# Patient Record
Sex: FEMALE | Race: WHITE | URBAN - METROPOLITAN AREA
[De-identification: names, ages, dates, MRNs, and addresses within clinical notes are randomized per-mention and may not be internally consistent; named-entity substitution may affect disease eponyms.]

---
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
## 2025-03-01 ASSESSMENT — PATIENT HEALTH QUESTIONNAIRE - PHQ9
1. LITTLE INTEREST OR PLEASURE IN DOING THINGS: NOT AT ALL
SUM OF ALL RESPONSES TO PHQ QUESTIONS 1-9: 0
SUM OF ALL RESPONSES TO PHQ9 QUESTIONS 1 & 2: 0
SUM OF ALL RESPONSES TO PHQ QUESTIONS 1-9: 0
SUM OF ALL RESPONSES TO PHQ QUESTIONS 1-9: 0
1. LITTLE INTEREST OR PLEASURE IN DOING THINGS: NOT AT ALL
2. FEELING DOWN, DEPRESSED OR HOPELESS: NOT AT ALL
2. FEELING DOWN, DEPRESSED OR HOPELESS: NOT AT ALL
SUM OF ALL RESPONSES TO PHQ QUESTIONS 1-9: 0

## 2025-03-04 ENCOUNTER — OFFICE VISIT (OUTPATIENT)
Dept: PRIMARY CARE CLINIC | Age: 20
End: 2025-03-04
Payer: COMMERCIAL

## 2025-03-04 VITALS
RESPIRATION RATE: 17 BRPM | WEIGHT: 122 LBS | OXYGEN SATURATION: 98 % | DIASTOLIC BLOOD PRESSURE: 66 MMHG | BODY MASS INDEX: 20.83 KG/M2 | SYSTOLIC BLOOD PRESSURE: 110 MMHG | TEMPERATURE: 97.9 F | HEART RATE: 77 BPM | HEIGHT: 64 IN

## 2025-03-04 DIAGNOSIS — M22.2X1 PATELLOFEMORAL ARTHRALGIA OF BOTH KNEES: ICD-10-CM

## 2025-03-04 DIAGNOSIS — Z00.00 HEALTHCARE MAINTENANCE: Primary | ICD-10-CM

## 2025-03-04 DIAGNOSIS — M22.2X2 PATELLOFEMORAL ARTHRALGIA OF BOTH KNEES: ICD-10-CM

## 2025-03-04 PROCEDURE — 99395 PREV VISIT EST AGE 18-39: CPT | Performed by: FAMILY MEDICINE

## 2025-03-04 RX ORDER — TIMOLOL MALEATE 5 MG/ML
1 SOLUTION/ DROPS OPHTHALMIC DAILY
COMMUNITY
End: 2025-03-04

## 2025-03-04 SDOH — ECONOMIC STABILITY: FOOD INSECURITY: WITHIN THE PAST 12 MONTHS, THE FOOD YOU BOUGHT JUST DIDN'T LAST AND YOU DIDN'T HAVE MONEY TO GET MORE.: NEVER TRUE

## 2025-03-04 SDOH — ECONOMIC STABILITY: FOOD INSECURITY: WITHIN THE PAST 12 MONTHS, YOU WORRIED THAT YOUR FOOD WOULD RUN OUT BEFORE YOU GOT MONEY TO BUY MORE.: NEVER TRUE

## 2025-03-04 ASSESSMENT — ENCOUNTER SYMPTOMS
ABDOMINAL PAIN: 0
SHORTNESS OF BREATH: 0
BLOOD IN STOOL: 0
NAUSEA: 0
COLOR CHANGE: 0
DIARRHEA: 0
RHINORRHEA: 0
COUGH: 0
VOMITING: 0

## 2025-03-04 NOTE — ASSESSMENT & PLAN NOTE
Overall doing well. Reminded to schedule eye exam when able. Other problems addressed today as otherwise noted.

## 2025-03-04 NOTE — PROGRESS NOTES
provided. Other applicable patient education information provided in AVS.    Counseling  The patient was counseled regarding motor vehicle safety and sun exposure.  If needed, the patient was counseled regarding diet and exercise.  The patient was given information regarding the dangers of smoking and the overuse of alcohol.  The patient was counseled regarding Living Will/Durable Power-Of-.

## 2025-03-04 NOTE — ASSESSMENT & PLAN NOTE
Poorly controlled and chronic. Likely 2/2 stressor with more standing. Offered PT and imaging and/or referral vs stretches at home TID PRN. She will try the latter and if not improved, will let me know. Call back/ED precautions discussed.